# Patient Record
Sex: FEMALE | URBAN - METROPOLITAN AREA
[De-identification: names, ages, dates, MRNs, and addresses within clinical notes are randomized per-mention and may not be internally consistent; named-entity substitution may affect disease eponyms.]

---

## 2024-05-02 ENCOUNTER — NURSING HOME VISIT (OUTPATIENT)
Dept: POST ACUTE CARE | Facility: EXTERNAL LOCATION | Age: 84
End: 2024-05-02
Payer: MEDICARE

## 2024-05-02 DIAGNOSIS — C91.10 CLL (CHRONIC LYMPHOCYTIC LEUKEMIA) (MULTI): ICD-10-CM

## 2024-05-02 DIAGNOSIS — I43 CARDIOMYOPATHY, HYPERTENSIVE, BENIGN, WITHOUT HEART FAILURE (MULTI): ICD-10-CM

## 2024-05-02 DIAGNOSIS — N18.30 STAGE 3 CHRONIC KIDNEY DISEASE, UNSPECIFIED WHETHER STAGE 3A OR 3B CKD (MULTI): ICD-10-CM

## 2024-05-02 DIAGNOSIS — I25.118 CORONARY ARTERY DISEASE INVOLVING NATIVE HEART WITH OTHER FORM OF ANGINA PECTORIS, UNSPECIFIED VESSEL OR LESION TYPE (CMS-HCC): ICD-10-CM

## 2024-05-02 DIAGNOSIS — D69.6 THROMBOCYTOPENIA (CMS-HCC): ICD-10-CM

## 2024-05-02 DIAGNOSIS — N17.9 AKI (ACUTE KIDNEY INJURY) (CMS-HCC): ICD-10-CM

## 2024-05-02 DIAGNOSIS — I11.9 CARDIOMYOPATHY, HYPERTENSIVE, BENIGN, WITHOUT HEART FAILURE (MULTI): ICD-10-CM

## 2024-05-02 DIAGNOSIS — S72.002D CLOSED FRACTURE OF LEFT HIP WITH ROUTINE HEALING, SUBSEQUENT ENCOUNTER: Primary | ICD-10-CM

## 2024-05-02 PROBLEM — I25.10 CORONARY ARTERY DISEASE INVOLVING NATIVE HEART: Status: ACTIVE | Noted: 2024-05-02

## 2024-05-02 PROCEDURE — 99497 ADVNCD CARE PLAN 30 MIN: CPT | Performed by: EMERGENCY MEDICINE

## 2024-05-02 PROCEDURE — 99306 1ST NF CARE HIGH MDM 50: CPT | Performed by: EMERGENCY MEDICINE

## 2024-05-02 NOTE — PROGRESS NOTES
Loreto Bonner   84 y.o.  female  @location@            Assessment and Plan:  History and physical      Diagnosis: Accidental fall - [id number]-SY W19.XXXA, Medical, Chemotherapy-induced thrombocytopenia - XNN78-QH D69.59, Medical, CLL (chronic lymphocytic leukemia) - MZO85-ZX C91.10, Medical, Closed fracture of left hip - NUS18-SI S72.002A, Medical, Contusion of left hip and thigh - OEF63-VR S70.02XA, Medical, Edema - Leg - PNED [id number], Emergency medicine, Medical, Edema of both lower extremities due to peripheral venous insufficiency - GGI36-BN I87.2, Medical, Fall - PNED [id number], Emergency medicine, Medical, Inability to ambulate due to multiple joints - PYF07-MO R26.2, Medical, Left knee sprain - NIG37-RW S83.92XA, Medical.      1. Acute nondisplaced left femoral neck fracture: Bedrest, Pain control. Ortho consulted. Check venous duplex  2. Possible acute fracture of the lateral tibial condyle with small joint effusion: Bedrest, Pain control. Ortho consulted.  3. CAD s/p CABG x 3 2011, HTN, HLD, RBBB: BNP elevated, Cardiomegaly on CXR but no signs of fluid overload on exam. Check Echocardiogram. Cardiology consulted for cardiac assessment prior to surgery. (has not seen a cardiologist since moving back to [place] in 2022)  4. JAYDA on CKD: Continue gentle IVF and reassess in AM. Avoid nephrotoxic agents.  5. Hypothyroidism, GERD, CLL in remission, Chronic Thrombocytopenia: Stable, continue home meds as ordered.     Medications ST   Home Medications (12) Active  amLODIPine 10 mg oral tablet 10 mg = 1 tabs, ORAL, DAILY  aspirin 81 mg oral delayed release tablet 81 mg = 1 tabs, ORAL, QPM  atenolol 25 mg oral tablet 25 mg = 1 tabs, ORAL, DAILY  atorvastatin 80 mg oral tablet 80 mg = 1 tabs, ORAL, QPM  Caltrate 600 mg oral tablet 600 mg = 1 tabs, ORAL, DAILY  Daily Multiple for Women 50+ oral tablet 1 tabs, ORAL, DAILY  gabapentin 100 mg oral capsule 100 mg = 1 caps,  ORAL, TID  Norco 5 mg-325 mg oral tablet 2 tabs, PRN, ORAL, [id number]  omeprazole 40 mg oral delayed release capsule 40 mg = 1 caps, ORAL, DAILY  Senokot S (docu 50mg/senna 8.6mg) 1 tabs, ORAL, BID  Synthroid 75 mcg (0.075 mg) oral tablet 75 mcg = 1 tabs, ORAL, DAILY  terazosin 10 mg oral capsule 10 mg = 1 caps, ORAL, QPM  :  (Complete Discharge Med Rec prior to selecting ST).         I discussed advanced care planning including the explanation and discussion of advanced directives. If patient does not have current up to date documents, examples and information provided on how to create both living will and power of . Patient was encouraged to work on completing these documents.  Information and advise was also provided on DO NOT RESUSCITATE and patient encouraged to consider this  Patient is not sure about DNR at this time.  CODE STATUS is on file with the facility    Hospital Course   Clinical Summary     Loreto is an 84 year old female w/ HTN, HLD, CAD/previous MI/CABG x 3 vessels in 2011, Hypothyroidism, CKD, GERD, CLL in remission, chronic thrombocytopenia who sent me a message through her portal this morning due to concern for leg swelling. She stated that she had a mechanical fall at home on 4/9 and landed on her left side. She denies hitting her head or any LOC. Since then she has had difficulty with ambulating, some pain in the left knee and ankle, swelling in both legs. Notably, she has also had difficulty with urinating. Denies any pain/burning in urination. She lives alone but has her son nearby who helps her. She moved back to [place] in 2022 (from [place]) to be closer to family/after the death of her . I brought her into the office at 1:40 this afternoon but due to the inability to ambulate, pain, and urinary issues, advised going to the ED.    In the ED, imaging revealed a left hip fracture, possible left lateral tibial condyle fracture. Labs revealed elevated cr of 1.7 (baseline 1.2), BNP  278. Troponin 11 and 11 at the 0 and 2 hour nathen. EKG showed NSR, RBBB. UA unremarkable. COVID19/flu negative. CXR showed cardiomegaly. She was given Lasix, IVF, Morphine, Fentanyl. Ortho consulted. She was admitted for further evaluation and treatment.    Patient seen and examined this evening, sons at bedside. She states pain medication is helping control pain. Denies chest pain or shortness of breath. No other acute concerns.       Source of history: Nurse, Medical personnel, Medical record, Patient.  History limitation: None.    HPI:  History and physical    Patient is unable to give any detailed history and therefore history is obtained from the chart  No acute complaints voiced by the patient or acute concerns raised by nursing    History of hospitalization-    Loreto is an 84 year old female w/ HTN, HLD, CAD/previous MI/CABG x 3 vessels in 2011, Hypothyroidism, CKD, GERD, CLL in remission, chronic thrombocytopenia who sent me a message through her portal this morning due to concern for leg swelling. She stated that she had a mechanical fall at home on 4/9 and landed on her left side. She denies hitting her head or any LOC. Since then she has had difficulty with ambulating, some pain in the left knee and ankle, swelling in both legs. Notably, she has also had difficulty with urinating. Denies any pain/burning in urination. She lives alone but has her son nearby who helps her. She moved back to [place] in 2022 (from [place]) to be closer to family/after the death of her . I brought her into the office at 1:40 this afternoon but due to the inability to ambulate, pain, and urinary issues, advised going to the ED.    In the ED, imaging revealed a left hip fracture, possible left lateral tibial condyle fracture. Labs revealed elevated cr of 1.7 (baseline 1.2), . Troponin 11 and 11 at the 0 and 2 hour nathen. EKG showed NSR, RBBB. UA unremarkable. COVID19/flu negative. CXR showed cardiomegaly. She was given  Lasix, IVF, Morphine, Fentanyl. Ortho consulted. She was admitted for further evaluation and treatment.    Patient seen and examined this evening, sons at bedside. She states pain medication is helping control pain. Denies chest pain or shortness of breath. No other acute concerns.      Problem List/Past Medical History Ongoing Onychomycosis Peripheral sensory neuropathy Medication Administration Administered: Medications:   Lasix, 40 mg, IV Push (04/11/2024 16:10 EDT) morphine, 2 mg, IV Push (04/11/2024 16:10 EDT) ondansetron, 4 mg, IV Push (04/11/2024 16:10 EDT) Medication Reconciliation   Unchanged amLODIPine (amLODIPine 10 mg oral tablet)90 tabs, 0 Refill(s). aspirin (aspirin 81 mg oral capsule)1 Capsules Oral DAILY. do not exceed 48 capsules in 24 hours. atenolol (atenolol 50 mg oral tablet)90 tabs, 0 Refill(s). atorvastatin (atorvastatin 80 mg oral tablet)90 tabs, 0 Refill(s). clobetasol topical (Clobetasol (Eqv-Temovate) 0.05% topical cream)60 g, 0 Refill(s), APPLY TO AFFECTED AREAS ON BODY TWICE A DAY MON-FRI, STOPPING ON SATURDAY & SUNDAY. REPEAT AS NEEDED. gabapentin (gabapentin 100 mg oral capsule)270 caps, 0 Refill(s). hydrochlorothiazide = HydroDIURIL (hydroCHLOROthiazide 12.5 mg oral tablet)90 tabs, 0 Refill(s). levothyroxine (Synthroid 75 mcg (0.075 mg) oral tablet)90 tabs, 0 Refill(s). omeprazole (omeprazole 40 mg oral delayed release capsule)90 caps, 0 Refill(s). piroxicam (piroxicam 20 mg oral capsule)90 caps, 0 Refill(s). terazosin = Hytrin (terazosin 10 mg oral capsule)90 caps, 0 Refill(s). Allergies No Known Allergies Social History   Alcohol - Denies Alcohol Use   Substance Abuse - Denies Substance Abuse   Tobacco - Denies Tobacco Use      Physical Exam:  Vital signs as per nursing/MA documentation were reviewed  General appearance: Alert and in no acute distress  HEENT: Normal Inspection  Neck - Normal Inspection  Respiratory : No respiratory distress. Lungs are clear   Cardiovascular: heart  rate normal. No gallop  Back - normal inspection  Skin inspection:Warm  Musculoskeletal : No deformities  Neuro : Limited exam. Baseline    ROS: Review of symptoms is negative except for what is mentioned in HPI    Results/Data  Records including but not limited to electronic medical records, relevant lab work and imaging from patient's health care facility encounter were reviewed and independently verified      Charting was completed using voice recognition technology and may include unintended errors.    Discussed with patient/family, RN, and NP.

## 2024-05-02 NOTE — LETTER
Patient: Loreto Bonner  : 1940    Encounter Date: 2024    Loreto Bonner   84 y.o.  female  @location@            Assessment and Plan:  History and physical      Diagnosis: Accidental fall - [id number] W19.XXXA, Medical, Chemotherapy-induced thrombocytopenia - PUB67-ZC D69.59, Medical, CLL (chronic lymphocytic leukemia) - TSB35-HC C91.10, Medical, Closed fracture of left hip - PYT50-NG S72.002A, Medical, Contusion of left hip and thigh - EGK67-QA S70.02XA, Medical, Edema - Leg - PNED [id number], Emergency medicine, Medical, Edema of both lower extremities due to peripheral venous insufficiency - UIK60-AT I87.2, Medical, Fall - PNED [id number], Emergency medicine, Medical, Inability to ambulate due to multiple joints - SXJ99-IF R26.2, Medical, Left knee sprain - PRF94-JD S83.92XA, Medical.      1. Acute nondisplaced left femoral neck fracture: Bedrest, Pain control. Ortho consulted. Check venous duplex  2. Possible acute fracture of the lateral tibial condyle with small joint effusion: Bedrest, Pain control. Ortho consulted.  3. CAD s/p CABG x 3 , HTN, HLD, RBBB: BNP elevated, Cardiomegaly on CXR but no signs of fluid overload on exam. Check Echocardiogram. Cardiology consulted for cardiac assessment prior to surgery. (has not seen a cardiologist since moving back to [place] in )  4. JAYDA on CKD: Continue gentle IVF and reassess in AM. Avoid nephrotoxic agents.  5. Hypothyroidism, GERD, CLL in remission, Chronic Thrombocytopenia: Stable, continue home meds as ordered.     Medications ST   Home Medications (12) Active  amLODIPine 10 mg oral tablet 10 mg = 1 tabs, ORAL, DAILY  aspirin 81 mg oral delayed release tablet 81 mg = 1 tabs, ORAL, QPM  atenolol 25 mg oral tablet 25 mg = 1 tabs, ORAL, DAILY  atorvastatin 80 mg oral tablet 80 mg = 1 tabs, ORAL, QPM  Caltrate 600 mg oral tablet 600 mg = 1 tabs, ORAL, DAILY  Daily Multiple for Women 50+ oral tablet 1  tabs, ORAL, DAILY  gabapentin 100 mg oral capsule 100 mg = 1 caps, ORAL, TID  Norco 5 mg-325 mg oral tablet 2 tabs, PRN, ORAL, [id number]  omeprazole 40 mg oral delayed release capsule 40 mg = 1 caps, ORAL, DAILY  Senokot S (docu 50mg/senna 8.6mg) 1 tabs, ORAL, BID  Synthroid 75 mcg (0.075 mg) oral tablet 75 mcg = 1 tabs, ORAL, DAILY  terazosin 10 mg oral capsule 10 mg = 1 caps, ORAL, QPM  :  (Complete Discharge Med Rec prior to selecting ST).         I discussed advanced care planning including the explanation and discussion of advanced directives. If patient does not have current up to date documents, examples and information provided on how to create both living will and power of . Patient was encouraged to work on completing these documents.  Information and advise was also provided on DO NOT RESUSCITATE and patient encouraged to consider this  Patient is not sure about DNR at this time.  CODE STATUS is on file with the facility    Hospital Course   Clinical Summary     Loreto is an 84 year old female w/ HTN, HLD, CAD/previous MI/CABG x 3 vessels in 2011, Hypothyroidism, CKD, GERD, CLL in remission, chronic thrombocytopenia who sent me a message through her portal this morning due to concern for leg swelling. She stated that she had a mechanical fall at home on 4/9 and landed on her left side. She denies hitting her head or any LOC. Since then she has had difficulty with ambulating, some pain in the left knee and ankle, swelling in both legs. Notably, she has also had difficulty with urinating. Denies any pain/burning in urination. She lives alone but has her son nearby who helps her. She moved back to [place] in 2022 (from [place]) to be closer to family/after the death of her . I brought her into the office at 1:40 this afternoon but due to the inability to ambulate, pain, and urinary issues, advised going to the ED.    In the ED, imaging revealed a left hip fracture, possible left lateral tibial  condyle fracture. Labs revealed elevated cr of 1.7 (baseline 1.2), . Troponin 11 and 11 at the 0 and 2 hour nathen. EKG showed NSR, RBBB. UA unremarkable. COVID19/flu negative. CXR showed cardiomegaly. She was given Lasix, IVF, Morphine, Fentanyl. Ortho consulted. She was admitted for further evaluation and treatment.    Patient seen and examined this evening, sons at bedside. She states pain medication is helping control pain. Denies chest pain or shortness of breath. No other acute concerns.       Source of history: Nurse, Medical personnel, Medical record, Patient.  History limitation: None.    HPI:  History and physical    Patient is unable to give any detailed history and therefore history is obtained from the chart  No acute complaints voiced by the patient or acute concerns raised by nursing    History of hospitalization-    Loreto is an 84 year old female w/ HTN, HLD, CAD/previous MI/CABG x 3 vessels in 2011, Hypothyroidism, CKD, GERD, CLL in remission, chronic thrombocytopenia who sent me a message through her portal this morning due to concern for leg swelling. She stated that she had a mechanical fall at home on 4/9 and landed on her left side. She denies hitting her head or any LOC. Since then she has had difficulty with ambulating, some pain in the left knee and ankle, swelling in both legs. Notably, she has also had difficulty with urinating. Denies any pain/burning in urination. She lives alone but has her son nearby who helps her. She moved back to [place] in 2022 (from [place]) to be closer to family/after the death of her . I brought her into the office at 1:40 this afternoon but due to the inability to ambulate, pain, and urinary issues, advised going to the ED.    In the ED, imaging revealed a left hip fracture, possible left lateral tibial condyle fracture. Labs revealed elevated cr of 1.7 (baseline 1.2), . Troponin 11 and 11 at the 0 and 2 hour nathen. EKG showed NSR, RBBB. UA  unremarkable. COVID19/flu negative. CXR showed cardiomegaly. She was given Lasix, IVF, Morphine, Fentanyl. Ortho consulted. She was admitted for further evaluation and treatment.    Patient seen and examined this evening, sons at bedside. She states pain medication is helping control pain. Denies chest pain or shortness of breath. No other acute concerns.      Problem List/Past Medical History Ongoing Onychomycosis Peripheral sensory neuropathy Medication Administration Administered: Medications:   Lasix, 40 mg, IV Push (04/11/2024 16:10 EDT) morphine, 2 mg, IV Push (04/11/2024 16:10 EDT) ondansetron, 4 mg, IV Push (04/11/2024 16:10 EDT) Medication Reconciliation   Unchanged amLODIPine (amLODIPine 10 mg oral tablet)90 tabs, 0 Refill(s). aspirin (aspirin 81 mg oral capsule)1 Capsules Oral DAILY. do not exceed 48 capsules in 24 hours. atenolol (atenolol 50 mg oral tablet)90 tabs, 0 Refill(s). atorvastatin (atorvastatin 80 mg oral tablet)90 tabs, 0 Refill(s). clobetasol topical (Clobetasol (Eqv-Temovate) 0.05% topical cream)60 g, 0 Refill(s), APPLY TO AFFECTED AREAS ON BODY TWICE A DAY MON-FRI, STOPPING ON SATURDAY & SUNDAY. REPEAT AS NEEDED. gabapentin (gabapentin 100 mg oral capsule)270 caps, 0 Refill(s). hydrochlorothiazide = HydroDIURIL (hydroCHLOROthiazide 12.5 mg oral tablet)90 tabs, 0 Refill(s). levothyroxine (Synthroid 75 mcg (0.075 mg) oral tablet)90 tabs, 0 Refill(s). omeprazole (omeprazole 40 mg oral delayed release capsule)90 caps, 0 Refill(s). piroxicam (piroxicam 20 mg oral capsule)90 caps, 0 Refill(s). terazosin = Hytrin (terazosin 10 mg oral capsule)90 caps, 0 Refill(s). Allergies No Known Allergies Social History   Alcohol - Denies Alcohol Use   Substance Abuse - Denies Substance Abuse   Tobacco - Denies Tobacco Use      Physical Exam:  Vital signs as per nursing/MA documentation were reviewed  General appearance: Alert and in no acute distress  HEENT: Normal Inspection  Neck - Normal  Inspection  Respiratory : No respiratory distress. Lungs are clear   Cardiovascular: heart rate normal. No gallop  Back - normal inspection  Skin inspection:Warm  Musculoskeletal : No deformities  Neuro : Limited exam. Baseline    ROS: Review of symptoms is negative except for what is mentioned in HPI    Results/Data  Records including but not limited to electronic medical records, relevant lab work and imaging from patient's health care facility encounter were reviewed and independently verified      Charting was completed using voice recognition technology and may include unintended errors.    Discussed with patient/family, RN, and NP.      Electronically Signed By: Ismael Mark MD   5/2/24  4:28 PM